# Patient Record
Sex: MALE | Race: OTHER | HISPANIC OR LATINO | URBAN - METROPOLITAN AREA
[De-identification: names, ages, dates, MRNs, and addresses within clinical notes are randomized per-mention and may not be internally consistent; named-entity substitution may affect disease eponyms.]

---

## 2024-02-20 ENCOUNTER — HOSPITAL ENCOUNTER (INPATIENT)
Facility: HOSPITAL | Age: 59
LOS: 1 days | Discharge: HOME | End: 2024-02-21
Attending: STUDENT IN AN ORGANIZED HEALTH CARE EDUCATION/TRAINING PROGRAM | Admitting: INTERNAL MEDICINE

## 2024-02-20 ENCOUNTER — APPOINTMENT (OUTPATIENT)
Dept: CARDIOLOGY | Facility: HOSPITAL | Age: 59
End: 2024-02-20

## 2024-02-20 DIAGNOSIS — E11.10 DIABETIC KETOACIDOSIS WITHOUT COMA ASSOCIATED WITH TYPE 2 DIABETES MELLITUS (MULTI): Primary | ICD-10-CM

## 2024-02-20 DIAGNOSIS — I15.2 HYPERTENSION ASSOCIATED WITH DIABETES (MULTI): ICD-10-CM

## 2024-02-20 DIAGNOSIS — E78.5 DYSLIPIDEMIA ASSOCIATED WITH TYPE 2 DIABETES MELLITUS (MULTI): ICD-10-CM

## 2024-02-20 DIAGNOSIS — I25.119 CORONARY ARTERY DISEASE INVOLVING NATIVE CORONARY ARTERY OF NATIVE HEART WITH ANGINA PECTORIS (CMS-HCC): Chronic | ICD-10-CM

## 2024-02-20 DIAGNOSIS — E11.59 HYPERTENSION ASSOCIATED WITH DIABETES (MULTI): ICD-10-CM

## 2024-02-20 DIAGNOSIS — E11.00 TYPE II DIABETES MELLITUS WITH HYPEROSMOLARITY, UNCONTROLLED (MULTI): ICD-10-CM

## 2024-02-20 DIAGNOSIS — E11.69 DYSLIPIDEMIA ASSOCIATED WITH TYPE 2 DIABETES MELLITUS (MULTI): ICD-10-CM

## 2024-02-20 LAB
ALBUMIN SERPL-MCNC: 4.3 G/DL (ref 3.5–5)
ALP BLD-CCNC: 154 U/L (ref 35–125)
ALT SERPL-CCNC: 95 U/L (ref 5–40)
ANION GAP BLDV CALCULATED.4IONS-SCNC: 24 MMOL/L (ref 10–25)
ANION GAP BLDV CALCULATED.4IONS-SCNC: ABNORMAL MMOL/L
ANION GAP SERPL CALC-SCNC: >19 MMOL/L
APPEARANCE UR: CLEAR
AST SERPL-CCNC: 57 U/L (ref 5–40)
B-OH-BUTYR+ACETOACET BLD-SCNC: >9 MMOL/L (ref 0.1–0.3)
BASE EXCESS BLDV CALC-SCNC: -6.3 MMOL/L (ref -2–3)
BASE EXCESS BLDV CALC-SCNC: -8.2 MMOL/L (ref -2–3)
BASOPHILS # BLD AUTO: 0.02 X10*3/UL (ref 0–0.1)
BASOPHILS NFR BLD AUTO: 0.4 %
BILIRUB SERPL-MCNC: 0.2 MG/DL (ref 0.1–1.2)
BILIRUB UR STRIP.AUTO-MCNC: NEGATIVE MG/DL
BODY TEMPERATURE: 37 DEGREES CELSIUS
BODY TEMPERATURE: 37 DEGREES CELSIUS
BUN SERPL-MCNC: 35 MG/DL (ref 8–25)
CA-I BLDV-SCNC: 1.14 MMOL/L (ref 1.1–1.33)
CA-I BLDV-SCNC: 1.27 MMOL/L (ref 1.1–1.33)
CALCIUM SERPL-MCNC: 10.2 MG/DL (ref 8.5–10.4)
CHLORIDE BLDV-SCNC: 102 MMOL/L (ref 98–107)
CHLORIDE BLDV-SCNC: 104 MMOL/L (ref 98–107)
CHLORIDE SERPL-SCNC: 97 MMOL/L (ref 97–107)
CO2 SERPL-SCNC: 17 MMOL/L (ref 24–31)
COLOR UR: ABNORMAL
CREAT SERPL-MCNC: 1 MG/DL (ref 0.4–1.6)
EGFRCR SERPLBLD CKD-EPI 2021: 88 ML/MIN/1.73M*2
EOSINOPHIL # BLD AUTO: 0.01 X10*3/UL (ref 0–0.7)
EOSINOPHIL NFR BLD AUTO: 0.2 %
ERYTHROCYTE [DISTWIDTH] IN BLOOD BY AUTOMATED COUNT: 13.2 % (ref 11.5–14.5)
EST. AVERAGE GLUCOSE BLD GHB EST-MCNC: 329 MG/DL
GLUCOSE BLD MANUAL STRIP-MCNC: 179 MG/DL (ref 74–99)
GLUCOSE BLD MANUAL STRIP-MCNC: 253 MG/DL (ref 74–99)
GLUCOSE BLD MANUAL STRIP-MCNC: 263 MG/DL (ref 74–99)
GLUCOSE BLD MANUAL STRIP-MCNC: 498 MG/DL (ref 74–99)
GLUCOSE BLDV-MCNC: 579 MG/DL (ref 74–99)
GLUCOSE BLDV-MCNC: 682 MG/DL (ref 74–99)
GLUCOSE SERPL-MCNC: 505 MG/DL (ref 65–99)
GLUCOSE UR STRIP.AUTO-MCNC: ABNORMAL MG/DL
HBA1C MFR BLD: 13.1 %
HCO3 BLDV-SCNC: 17.9 MMOL/L (ref 22–26)
HCO3 BLDV-SCNC: 19.1 MMOL/L (ref 22–26)
HCT VFR BLD AUTO: 53.7 % (ref 41–52)
HCT VFR BLD EST: 52 % (ref 41–52)
HCT VFR BLD EST: ABNORMAL %
HGB BLD-MCNC: 17.8 G/DL (ref 13.5–17.5)
HGB BLDV-MCNC: 17.4 G/DL (ref 13.5–17.5)
HGB BLDV-MCNC: ABNORMAL G/DL
IMM GRANULOCYTES # BLD AUTO: 0.01 X10*3/UL (ref 0–0.7)
IMM GRANULOCYTES NFR BLD AUTO: 0.2 % (ref 0–0.9)
INHALED O2 CONCENTRATION: 100 %
INHALED O2 CONCENTRATION: 100 %
KETONES UR STRIP.AUTO-MCNC: ABNORMAL MG/DL
LACTATE BLDV-SCNC: 1.5 MMOL/L (ref 0.4–2)
LACTATE BLDV-SCNC: 2 MMOL/L (ref 0.4–2)
LACTATE BLDV-SCNC: 3.2 MMOL/L (ref 0.4–2)
LEUKOCYTE ESTERASE UR QL STRIP.AUTO: NEGATIVE
LYMPHOCYTES # BLD AUTO: 1.05 X10*3/UL (ref 1.2–4.8)
LYMPHOCYTES NFR BLD AUTO: 23.2 %
MCH RBC QN AUTO: 29.5 PG (ref 26–34)
MCHC RBC AUTO-ENTMCNC: 33.1 G/DL (ref 32–36)
MCV RBC AUTO: 89 FL (ref 80–100)
MONOCYTES # BLD AUTO: 0.5 X10*3/UL (ref 0.1–1)
MONOCYTES NFR BLD AUTO: 11 %
NEUTROPHILS # BLD AUTO: 2.94 X10*3/UL (ref 1.2–7.7)
NEUTROPHILS NFR BLD AUTO: 65 %
NITRITE UR QL STRIP.AUTO: NEGATIVE
NRBC BLD-RTO: 0 /100 WBCS (ref 0–0)
OXYHGB MFR BLDV: 83.7 % (ref 45–75)
OXYHGB MFR BLDV: ABNORMAL %
PCO2 BLDV: 37 MM HG (ref 41–51)
PCO2 BLDV: 38 MM HG (ref 41–51)
PH BLDV: 7.28 PH (ref 7.33–7.43)
PH BLDV: 7.32 PH (ref 7.33–7.43)
PH UR STRIP.AUTO: 5 [PH]
PLATELET # BLD AUTO: 254 X10*3/UL (ref 150–450)
PO2 BLDV: 58 MM HG (ref 35–45)
PO2 BLDV: 58 MM HG (ref 35–45)
POTASSIUM BLDV-SCNC: ABNORMAL MMOL/L
POTASSIUM BLDV-SCNC: ABNORMAL MMOL/L
POTASSIUM SERPL-SCNC: 5.1 MMOL/L (ref 3.4–5.1)
PROT SERPL-MCNC: 8.2 G/DL (ref 5.9–7.9)
PROT UR STRIP.AUTO-MCNC: NEGATIVE MG/DL
RBC # BLD AUTO: 6.04 X10*6/UL (ref 4.5–5.9)
RBC # UR STRIP.AUTO: NEGATIVE /UL
SAO2 % BLDV: 86 % (ref 45–75)
SAO2 % BLDV: ABNORMAL %
SODIUM BLDV-SCNC: 134 MMOL/L (ref 136–145)
SODIUM BLDV-SCNC: 141 MMOL/L (ref 136–145)
SODIUM SERPL-SCNC: 142 MMOL/L (ref 133–145)
SP GR UR STRIP.AUTO: 1.04
UROBILINOGEN UR STRIP.AUTO-MCNC: NORMAL MG/DL
WBC # BLD AUTO: 4.5 X10*3/UL (ref 4.4–11.3)

## 2024-02-20 PROCEDURE — 2060000001 HC INTERMEDIATE ICU ROOM DAILY

## 2024-02-20 PROCEDURE — 82947 ASSAY GLUCOSE BLOOD QUANT: CPT

## 2024-02-20 PROCEDURE — 2500000002 HC RX 250 W HCPCS SELF ADMINISTERED DRUGS (ALT 637 FOR MEDICARE OP, ALT 636 FOR OP/ED): Performed by: INTERNAL MEDICINE

## 2024-02-20 PROCEDURE — 82010 KETONE BODYS QUAN: CPT

## 2024-02-20 PROCEDURE — 96375 TX/PRO/DX INJ NEW DRUG ADDON: CPT

## 2024-02-20 PROCEDURE — 96361 HYDRATE IV INFUSION ADD-ON: CPT

## 2024-02-20 PROCEDURE — 93005 ELECTROCARDIOGRAM TRACING: CPT

## 2024-02-20 PROCEDURE — 36415 COLL VENOUS BLD VENIPUNCTURE: CPT | Performed by: STUDENT IN AN ORGANIZED HEALTH CARE EDUCATION/TRAINING PROGRAM

## 2024-02-20 PROCEDURE — 2500000004 HC RX 250 GENERAL PHARMACY W/ HCPCS (ALT 636 FOR OP/ED): Performed by: INTERNAL MEDICINE

## 2024-02-20 PROCEDURE — 85025 COMPLETE CBC W/AUTO DIFF WBC: CPT | Performed by: STUDENT IN AN ORGANIZED HEALTH CARE EDUCATION/TRAINING PROGRAM

## 2024-02-20 PROCEDURE — 83605 ASSAY OF LACTIC ACID: CPT

## 2024-02-20 PROCEDURE — 83036 HEMOGLOBIN GLYCOSYLATED A1C: CPT | Performed by: INTERNAL MEDICINE

## 2024-02-20 PROCEDURE — 93010 ELECTROCARDIOGRAM REPORT: CPT | Performed by: INTERNAL MEDICINE

## 2024-02-20 PROCEDURE — 84132 ASSAY OF SERUM POTASSIUM: CPT

## 2024-02-20 PROCEDURE — 81003 URINALYSIS AUTO W/O SCOPE: CPT | Performed by: STUDENT IN AN ORGANIZED HEALTH CARE EDUCATION/TRAINING PROGRAM

## 2024-02-20 PROCEDURE — 84132 ASSAY OF SERUM POTASSIUM: CPT | Performed by: STUDENT IN AN ORGANIZED HEALTH CARE EDUCATION/TRAINING PROGRAM

## 2024-02-20 PROCEDURE — 96374 THER/PROPH/DIAG INJ IV PUSH: CPT

## 2024-02-20 PROCEDURE — 2500000004 HC RX 250 GENERAL PHARMACY W/ HCPCS (ALT 636 FOR OP/ED)

## 2024-02-20 PROCEDURE — 99285 EMERGENCY DEPT VISIT HI MDM: CPT | Mod: 25

## 2024-02-20 RX ORDER — ATORVASTATIN CALCIUM 80 MG/1
80 TABLET, FILM COATED ORAL DAILY
Status: ON HOLD | COMMUNITY
End: 2024-02-21 | Stop reason: SDUPTHER

## 2024-02-20 RX ORDER — INSULIN GLARGINE 100 [IU]/ML
30 INJECTION, SOLUTION SUBCUTANEOUS NIGHTLY
Status: DISCONTINUED | OUTPATIENT
Start: 2024-02-20 | End: 2024-02-21 | Stop reason: HOSPADM

## 2024-02-20 RX ORDER — METOPROLOL TARTRATE 25 MG/1
12.5 TABLET, FILM COATED ORAL 2 TIMES DAILY
COMMUNITY

## 2024-02-20 RX ORDER — DEXTROSE MONOHYDRATE 100 MG/ML
0.3 INJECTION, SOLUTION INTRAVENOUS ONCE AS NEEDED
Status: DISCONTINUED | OUTPATIENT
Start: 2024-02-20 | End: 2024-02-21 | Stop reason: HOSPADM

## 2024-02-20 RX ORDER — GLIMEPIRIDE 2 MG/1
2 TABLET ORAL
COMMUNITY
End: 2024-02-21 | Stop reason: HOSPADM

## 2024-02-20 RX ORDER — DEXTROSE 50 % IN WATER (D50W) INTRAVENOUS SYRINGE
25
Status: DISCONTINUED | OUTPATIENT
Start: 2024-02-20 | End: 2024-02-21 | Stop reason: HOSPADM

## 2024-02-20 RX ORDER — KETOROLAC TROMETHAMINE 30 MG/ML
30 INJECTION, SOLUTION INTRAMUSCULAR; INTRAVENOUS ONCE
Status: COMPLETED | OUTPATIENT
Start: 2024-02-20 | End: 2024-02-20

## 2024-02-20 RX ORDER — INSULIN LISPRO 100 [IU]/ML
0-10 INJECTION, SOLUTION INTRAVENOUS; SUBCUTANEOUS
Status: DISCONTINUED | OUTPATIENT
Start: 2024-02-20 | End: 2024-02-21 | Stop reason: HOSPADM

## 2024-02-20 RX ORDER — FENOFIBRATE 54 MG/1
54 TABLET ORAL DAILY
COMMUNITY

## 2024-02-20 RX ORDER — INSULIN LISPRO 100 [IU]/ML
5 INJECTION, SOLUTION INTRAVENOUS; SUBCUTANEOUS
Status: DISCONTINUED | OUTPATIENT
Start: 2024-02-20 | End: 2024-02-21 | Stop reason: HOSPADM

## 2024-02-20 RX ORDER — ACETAMINOPHEN 325 MG/1
650 TABLET ORAL EVERY 6 HOURS PRN
Status: DISCONTINUED | OUTPATIENT
Start: 2024-02-20 | End: 2024-02-21 | Stop reason: HOSPADM

## 2024-02-20 RX ORDER — LISINOPRIL 10 MG/1
10 TABLET ORAL DAILY
COMMUNITY

## 2024-02-20 RX ORDER — SODIUM CHLORIDE, SODIUM LACTATE, POTASSIUM CHLORIDE, CALCIUM CHLORIDE 600; 310; 30; 20 MG/100ML; MG/100ML; MG/100ML; MG/100ML
125 INJECTION, SOLUTION INTRAVENOUS CONTINUOUS
Status: DISCONTINUED | OUTPATIENT
Start: 2024-02-20 | End: 2024-02-21 | Stop reason: HOSPADM

## 2024-02-20 RX ORDER — ONDANSETRON HYDROCHLORIDE 2 MG/ML
4 INJECTION, SOLUTION INTRAVENOUS ONCE
Status: COMPLETED | OUTPATIENT
Start: 2024-02-20 | End: 2024-02-20

## 2024-02-20 RX ADMIN — INSULIN LISPRO 6 UNITS: 100 INJECTION, SOLUTION INTRAVENOUS; SUBCUTANEOUS at 17:14

## 2024-02-20 RX ADMIN — ACETAMINOPHEN 650 MG: 325 TABLET ORAL at 21:59

## 2024-02-20 RX ADMIN — INSULIN LISPRO 5 UNITS: 100 INJECTION, SOLUTION INTRAVENOUS; SUBCUTANEOUS at 17:15

## 2024-02-20 RX ADMIN — SODIUM CHLORIDE 1000 ML: 900 INJECTION, SOLUTION INTRAVENOUS at 11:07

## 2024-02-20 RX ADMIN — KETOROLAC TROMETHAMINE 30 MG: 30 INJECTION INTRAMUSCULAR; INTRAVENOUS at 11:07

## 2024-02-20 RX ADMIN — ONDANSETRON 4 MG: 2 INJECTION INTRAMUSCULAR; INTRAVENOUS at 11:07

## 2024-02-20 RX ADMIN — SODIUM CHLORIDE 1000 ML: 900 INJECTION, SOLUTION INTRAVENOUS at 11:08

## 2024-02-20 RX ADMIN — SODIUM CHLORIDE, SODIUM LACTATE, POTASSIUM CHLORIDE, AND CALCIUM CHLORIDE 125 ML/HR: 600; 310; 30; 20 INJECTION, SOLUTION INTRAVENOUS at 15:37

## 2024-02-20 SDOH — SOCIAL STABILITY: SOCIAL INSECURITY: HAS ANYONE EVER THREATENED TO HURT YOUR FAMILY OR YOUR PETS?: UNABLE TO ASSESS

## 2024-02-20 SDOH — SOCIAL STABILITY: SOCIAL INSECURITY: WERE YOU ABLE TO COMPLETE ALL THE BEHAVIORAL HEALTH SCREENINGS?: NO

## 2024-02-20 SDOH — SOCIAL STABILITY: SOCIAL INSECURITY: DO YOU FEEL ANYONE HAS EXPLOITED OR TAKEN ADVANTAGE OF YOU FINANCIALLY OR OF YOUR PERSONAL PROPERTY?: UNABLE TO ASSESS

## 2024-02-20 SDOH — SOCIAL STABILITY: SOCIAL INSECURITY: DOES ANYONE TRY TO KEEP YOU FROM HAVING/CONTACTING OTHER FRIENDS OR DOING THINGS OUTSIDE YOUR HOME?: UNABLE TO ASSESS

## 2024-02-20 SDOH — SOCIAL STABILITY: SOCIAL INSECURITY: ARE YOU OR HAVE YOU BEEN THREATENED OR ABUSED PHYSICALLY, EMOTIONALLY, OR SEXUALLY BY ANYONE?: UNABLE TO ASSESS

## 2024-02-20 SDOH — SOCIAL STABILITY: SOCIAL INSECURITY: HAVE YOU HAD THOUGHTS OF HARMING ANYONE ELSE?: NO

## 2024-02-20 SDOH — SOCIAL STABILITY: SOCIAL INSECURITY: DO YOU FEEL UNSAFE GOING BACK TO THE PLACE WHERE YOU ARE LIVING?: UNABLE TO ASSESS

## 2024-02-20 SDOH — SOCIAL STABILITY: SOCIAL INSECURITY: ARE THERE ANY APPARENT SIGNS OF INJURIES/BEHAVIORS THAT COULD BE RELATED TO ABUSE/NEGLECT?: UNABLE TO ASSESS

## 2024-02-20 ASSESSMENT — PAIN - FUNCTIONAL ASSESSMENT
PAIN_FUNCTIONAL_ASSESSMENT: 0-10

## 2024-02-20 ASSESSMENT — ENCOUNTER SYMPTOMS
DYSURIA: 0
WHEEZING: 0
SINUS PAIN: 0
DIAPHORESIS: 0
CONSTIPATION: 1
PALPITATIONS: 0
SHORTNESS OF BREATH: 0
NAUSEA: 1
UNEXPECTED WEIGHT CHANGE: 1
COUGH: 0
DIZZINESS: 1
LIGHT-HEADEDNESS: 0
FATIGUE: 1
VOMITING: 0
EYE PAIN: 0
ABDOMINAL PAIN: 1
FEVER: 0
HEMATURIA: 0
POLYDIPSIA: 1
NUMBNESS: 0
WEAKNESS: 1
CHILLS: 0
HEADACHES: 1

## 2024-02-20 ASSESSMENT — COGNITIVE AND FUNCTIONAL STATUS - GENERAL
MOBILITY SCORE: 24
DAILY ACTIVITIY SCORE: 24
PATIENT BASELINE BEDBOUND: NO
MOBILITY SCORE: 24
DAILY ACTIVITIY SCORE: 24

## 2024-02-20 ASSESSMENT — PAIN DESCRIPTION - ORIENTATION: ORIENTATION: RIGHT

## 2024-02-20 ASSESSMENT — PATIENT HEALTH QUESTIONNAIRE - PHQ9
SUM OF ALL RESPONSES TO PHQ9 QUESTIONS 1 & 2: 0
2. FEELING DOWN, DEPRESSED OR HOPELESS: NOT AT ALL
1. LITTLE INTEREST OR PLEASURE IN DOING THINGS: NOT AT ALL

## 2024-02-20 ASSESSMENT — PAIN SCALES - GENERAL
PAINLEVEL_OUTOF10: 0 - NO PAIN
PAINLEVEL_OUTOF10: 7
PAINLEVEL_OUTOF10: 0 - NO PAIN
PAINLEVEL_OUTOF10: 3
PAINLEVEL_OUTOF10: 0 - NO PAIN

## 2024-02-20 ASSESSMENT — ACTIVITIES OF DAILY LIVING (ADL)
LACK_OF_TRANSPORTATION: PATIENT DECLINED
FEEDING YOURSELF: INDEPENDENT
GROOMING: INDEPENDENT
HEARING - LEFT EAR: FUNCTIONAL
DRESSING YOURSELF: INDEPENDENT
WALKS IN HOME: INDEPENDENT
PATIENT'S MEMORY ADEQUATE TO SAFELY COMPLETE DAILY ACTIVITIES?: YES
BATHING: INDEPENDENT
ADEQUATE_TO_COMPLETE_ADL: YES
HEARING - RIGHT EAR: FUNCTIONAL
TOILETING: INDEPENDENT
JUDGMENT_ADEQUATE_SAFELY_COMPLETE_DAILY_ACTIVITIES: YES

## 2024-02-20 ASSESSMENT — COLUMBIA-SUICIDE SEVERITY RATING SCALE - C-SSRS
1. IN THE PAST MONTH, HAVE YOU WISHED YOU WERE DEAD OR WISHED YOU COULD GO TO SLEEP AND NOT WAKE UP?: NO
6. HAVE YOU EVER DONE ANYTHING, STARTED TO DO ANYTHING, OR PREPARED TO DO ANYTHING TO END YOUR LIFE?: NO
2. HAVE YOU ACTUALLY HAD ANY THOUGHTS OF KILLING YOURSELF?: NO

## 2024-02-20 ASSESSMENT — LIFESTYLE VARIABLES
AUDIT-C TOTAL SCORE: 0
HOW OFTEN DO YOU HAVE A DRINK CONTAINING ALCOHOL: NEVER
SKIP TO QUESTIONS 9-10: 1
HOW OFTEN DO YOU HAVE 6 OR MORE DRINKS ON ONE OCCASION: NEVER
HOW MANY STANDARD DRINKS CONTAINING ALCOHOL DO YOU HAVE ON A TYPICAL DAY: PATIENT DOES NOT DRINK
AUDIT-C TOTAL SCORE: 0

## 2024-02-20 ASSESSMENT — PAIN DESCRIPTION - DESCRIPTORS: DESCRIPTORS: ACHING

## 2024-02-20 ASSESSMENT — PAIN DESCRIPTION - ONSET: ONSET: GRADUAL

## 2024-02-20 ASSESSMENT — PAIN DESCRIPTION - LOCATION
LOCATION: HEAD
LOCATION: HEAD

## 2024-02-20 ASSESSMENT — PAIN DESCRIPTION - PROGRESSION: CLINICAL_PROGRESSION: GRADUALLY WORSENING

## 2024-02-20 ASSESSMENT — PAIN DESCRIPTION - PAIN TYPE: TYPE: ACUTE PAIN

## 2024-02-20 ASSESSMENT — PAIN DESCRIPTION - FREQUENCY: FREQUENCY: CONSTANT/CONTINUOUS

## 2024-02-20 NOTE — H&P
History Of Present Illness  Jacob Hewitt is a 58 y.o. male with past medical history of diabetes mellitus, myocardial infarction, open heart surgery presenting with ketoacidosis.    Patient reports that his symptoms started on Friday, 2/16/2024.  Symptoms started with polyuria, polydipsia, blurred vision, dry mouth, malaise and continued to progress until today when his wife urged him to go to the hospital.      He has had 2 similar episodes in the past 3 years with the most recent one being in early October 2023.  On October 30, 2023 patient had a MI and subsequent open heart surgery.    Patient is a poor historian and has difficulty remembering most of his past medical history.  He speaks very little English, however he is able to communicate with no issue with the , he just does not remember his own history very well.  He relies on his son away from the room with him to provide some of his history    Says that the only doctor he sees is his primary care in [place].  Only recalls taking one medication, however he does not remember name of it.  Recalls that he used to take metformin but was switched to a single diabetic medication. An office visit from [facility] records show that he took several medications, so we will need to clarify that with him and he is on the floor.    His current glucose as of 2:27 PM is 263, it was 498 at 9:32 AM soon after admission.     Past Medical History  History reviewed. No pertinent past medical history.  MI, October 30 2023  DKA  DM diagnosed about 6 years ago      Surgical History  History reviewed. No pertinent surgical history.  Open heart surgery on October 2023     Social History  He has no history on file for tobacco use, alcohol use, and drug use.      Family History  No family history on file.  Father diabetes mellitus, diagnosis at 40  Several siblings with diabetes mellitus, 40s or 50s      Allergies  Patient has no known allergies.  None    Review  of Systems   Constitutional:  Positive for fatigue and unexpected weight change (loss unspecified amount). Negative for chills, diaphoresis and fever.   HENT:  Negative for congestion, ear pain, hearing loss and sinus pain.    Eyes:  Positive for visual disturbance. Negative for pain.        Blurry vision bilaterally   Respiratory:  Negative for cough, shortness of breath and wheezing.    Cardiovascular:  Negative for chest pain, palpitations and leg swelling.   Gastrointestinal:  Positive for abdominal pain, constipation and nausea. Negative for vomiting.        Decreased appetite due to nausea   Endocrine: Positive for polydipsia and polyuria. Negative for cold intolerance and heat intolerance.   Genitourinary:  Negative for dysuria, hematuria and urgency.   Neurological:  Positive for dizziness, weakness and headaches. Negative for syncope, light-headedness and numbness.        Denies paresthesia        Physical Exam  Constitutional:       General: He is not in acute distress.     Appearance: Normal appearance. He is normal weight. He is not toxic-appearing or diaphoretic.   HENT:      Head: Normocephalic and atraumatic.   Eyes:      Pupils: Pupils are equal, round, and reactive to light.   Cardiovascular:      Rate and Rhythm: Normal rate and regular rhythm.      Heart sounds: No murmur heard.     No friction rub. No gallop.   Pulmonary:      Effort: Pulmonary effort is normal.      Breath sounds: No wheezing, rhonchi or rales.   Abdominal:      Palpations: Abdomen is soft. There is no mass.      Tenderness: There is no abdominal tenderness.      Hernia: No hernia is present.   Musculoskeletal:         General: No swelling or deformity.      Comments: Scar on anterior chest from recent open heart surgery, well-healed    Skin:     General: Skin is warm and dry.      Coloration: Skin is not jaundiced or pale.   Neurological:      General: No focal deficit present.      Mental Status: He is alert and oriented to  "person, place, and time.      Motor: No weakness.   Psychiatric:         Mood and Affect: Mood normal.         Behavior: Behavior normal.          Last Recorded Vitals  Blood pressure 142/78, pulse 81, temperature 36.2 °C (97.2 °F), resp. rate 13, height 1.702 m (5' 7\"), weight 66.3 kg (146 lb 2.6 oz), SpO2 99 %.    Relevant Results      Scheduled medications  insulin glargine, 30 Units, subcutaneous, Nightly  insulin lispro, 0-10 Units, subcutaneous, TID with meals  insulin lispro, 5 Units, subcutaneous, TID with meals      Continuous medications  lactated Ringer's, 125 mL/hr, Last Rate: 125 mL/hr (02/20/24 1537)      PRN medications  PRN medications: dextrose 10 % in water (D10W), dextrose, glucagon  Results for orders placed or performed during the hospital encounter of 02/20/24 (from the past 24 hour(s))   POCT GLUCOSE   Result Value Ref Range    POCT Glucose 498 (H) 74 - 99 mg/dL   CBC and Auto Differential   Result Value Ref Range    WBC 4.5 4.4 - 11.3 x10*3/uL    nRBC 0.0 0.0 - 0.0 /100 WBCs    RBC 6.04 (H) 4.50 - 5.90 x10*6/uL    Hemoglobin 17.8 (H) 13.5 - 17.5 g/dL    Hematocrit 53.7 (H) 41.0 - 52.0 %    MCV 89 80 - 100 fL    MCH 29.5 26.0 - 34.0 pg    MCHC 33.1 32.0 - 36.0 g/dL    RDW 13.2 11.5 - 14.5 %    Platelets 254 150 - 450 x10*3/uL    Neutrophils % 65.0 40.0 - 80.0 %    Immature Granulocytes %, Automated 0.2 0.0 - 0.9 %    Lymphocytes % 23.2 13.0 - 44.0 %    Monocytes % 11.0 2.0 - 10.0 %    Eosinophils % 0.2 0.0 - 6.0 %    Basophils % 0.4 0.0 - 2.0 %    Neutrophils Absolute 2.94 1.20 - 7.70 x10*3/uL    Immature Granulocytes Absolute, Automated 0.01 0.00 - 0.70 x10*3/uL    Lymphocytes Absolute 1.05 (L) 1.20 - 4.80 x10*3/uL    Monocytes Absolute 0.50 0.10 - 1.00 x10*3/uL    Eosinophils Absolute 0.01 0.00 - 0.70 x10*3/uL    Basophils Absolute 0.02 0.00 - 0.10 x10*3/uL   BLOOD GAS VENOUS FULL PANEL   Result Value Ref Range    POCT pH, Venous 7.28 (L) 7.33 - 7.43 pH    POCT pCO2, Venous 38 (L) 41 - 51 mm " Hg    POCT pO2, Venous 58 (H) 35 - 45 mm Hg    POCT SO2, Venous      POCT Oxy Hemoglobin, Venous      POCT Hematocrit Calculated, Venous      POCT Sodium, Venous 134 (L) 136 - 145 mmol/L    POCT Potassium, Venous      POCT Chloride, Venous 102 98 - 107 mmol/L    POCT Ionized Calicum, Venous 1.14 1.10 - 1.33 mmol/L    POCT Glucose, Venous 682 (HH) 74 - 99 mg/dL    POCT Lactate, Venous 3.2 (H) 0.4 - 2.0 mmol/L    POCT Base Excess, Venous -8.2 (L) -2.0 - 3.0 mmol/L    POCT HCO3 Calculated, Venous 17.9 (L) 22.0 - 26.0 mmol/L    POCT Hemoglobin, Venous      POCT Anion Gap, Venous      Patient Temperature 37.0 degrees Celsius    FiO2 100 %   Urinalysis with Reflex Culture and Microscopic   Result Value Ref Range    Color, Urine Light-Yellow Light-Yellow, Yellow, Dark-Yellow    Appearance, Urine Clear Clear    Specific Gravity, Urine 1.037 (N) 1.005 - 1.035    pH, Urine 5.0 5.0, 5.5, 6.0, 6.5, 7.0, 7.5, 8.0    Protein, Urine NEGATIVE NEGATIVE, 10 (TRACE), 20 (TRACE) mg/dL    Glucose, Urine OVER (4+) (A) Normal mg/dL    Blood, Urine NEGATIVE NEGATIVE    Ketones, Urine 80 (3+) (A) NEGATIVE mg/dL    Bilirubin, Urine NEGATIVE NEGATIVE    Urobilinogen, Urine Normal Normal mg/dL    Nitrite, Urine NEGATIVE NEGATIVE    Leukocyte Esterase, Urine NEGATIVE NEGATIVE   Blood Gas Venous Full Panel   Result Value Ref Range    POCT pH, Venous 7.32 (L) 7.33 - 7.43 pH    POCT pCO2, Venous 37 (L) 41 - 51 mm Hg    POCT pO2, Venous 58 (H) 35 - 45 mm Hg    POCT SO2, Venous 86 (H) 45 - 75 %    POCT Oxy Hemoglobin, Venous 83.7 (H) 45.0 - 75.0 %    POCT Hematocrit Calculated, Venous 52.0 41.0 - 52.0 %    POCT Sodium, Venous 141 136 - 145 mmol/L    POCT Potassium, Venous      POCT Chloride, Venous 104 98 - 107 mmol/L    POCT Ionized Calicum, Venous 1.27 1.10 - 1.33 mmol/L    POCT Glucose, Venous 579 (HH) 74 - 99 mg/dL    POCT Lactate, Venous 2.0 0.4 - 2.0 mmol/L    POCT Base Excess, Venous -6.3 (L) -2.0 - 3.0 mmol/L    POCT HCO3 Calculated, Venous  19.1 (L) 22.0 - 26.0 mmol/L    POCT Hemoglobin, Venous 17.4 13.5 - 17.5 g/dL    POCT Anion Gap, Venous 24.0 10.0 - 25.0 mmol/L    Patient Temperature 37.0 degrees Celsius    FiO2 100 %   Comprehensive Metabolic Panel   Result Value Ref Range    Glucose 505 (HH) 65 - 99 mg/dL    Sodium 142 133 - 145 mmol/L    Potassium 5.1 3.4 - 5.1 mmol/L    Chloride 97 97 - 107 mmol/L    Bicarbonate 17 (L) 24 - 31 mmol/L    Urea Nitrogen 35 (H) 8 - 25 mg/dL    Creatinine 1.00 0.40 - 1.60 mg/dL    eGFR 88 >60 mL/min/1.73m*2    Calcium 10.2 8.5 - 10.4 mg/dL    Albumin 4.3 3.5 - 5.0 g/dL    Alkaline Phosphatase 154 (H) 35 - 125 U/L    Total Protein 8.2 (H) 5.9 - 7.9 g/dL    AST 57 (H) 5 - 40 U/L    Bilirubin, Total 0.2 0.1 - 1.2 mg/dL    ALT 95 (H) 5 - 40 U/L    Anion Gap >19 (H) <=19 mmol/L   POCT GLUCOSE   Result Value Ref Range    POCT Glucose 263 (H) 74 - 99 mg/dL          Assessment/Plan   Principal Problem:    Diabetic ketoacidosis without coma associated with type 2 diabetes mellitus (CMS/[facility])         Diabetic ketoacidosis   -insulin glargine, 30 Units, subcutaneous, Nightly  -insulin lispro, 5 Units, subcutaneous, TID with meals  -Monitor glucose and CBC hourly  -Lactated Ringer's continuous infusion 125 mg/h     Diabetes melitis type 2   -Same plan as above   -Continue at home medication after resolution of DKA    Coronary artery disease   -Continue medication regimen after verifying med list with patient upon admittance to unit   -Order basic lipid panel    Zee Swenson OMS III  4:06 PM 2/20/2024

## 2024-02-20 NOTE — CONSULTS
"Inpatient Diabetes Education Consult    Reason for Visit:  Jacob Hewitt is a 58 y.o. male who presents to ED on 2/20/24 at 09:40 with c/o \"increased thirst, blurred vision, dry mouth, fatigue\"  Found to have a BS of 505 mg/dl at 11:00.  Found to be in DKA    Consulting Service/Provider: GABE Dunn    BS today 2/20/24 at 09;32 was 498 mg/dl and at 14:27 was 263 mg/d.  Getting Lantus 30 units at bedtime and Lispro 0-10 units tid plus lispro 5 units tid.  H/o T2DM (dx 6 yrs ago), MI & CABG 10/2023.  Per medical record patient speaks very little English.  HbA1c in progress.  Messaged WINTER Oh to let me know if family shows up to see what time I can meet with family to educate them & patient on T2DM.  Also, left a phone message for Michelle Pascual [phone] (contact in Epic)  to see if she/family will be at hospital to educate patient.     PTA Medications:  No current outpatient medications    Glucose   Date/Time Value Ref Range Status   02/20/2024 11:00  (HH) 65 - 99 mg/dL Final     Comment:     Result rechecked   05/29/2023 10:53  (H) 65 - 99 MG/DL Final   01/08/2023 08:28  (H) 65 - 99 MG/DL Final   07/17/2019 08:34  (H) 65 - 99 MG/DL Final   02/18/2019 09:02 PM 1,138 (H) 65 - 99 MG/DL Final     Comment:     RECHECKED DILUTED   VERIPHY       No results found for: \"CPEPTIDE\"  Hemoglobin A1C   Date Value Ref Range Status   10/31/2022 6.3 (H) 4.3 - 5.6 % Final     Comment:     American Diabetes Association guidelines indicate that patients with HgbA1c in the range 5.7-6.4% are at increased risk for development of diabetes, and intervention by lifestyle modification may be beneficial. HgbA1c greater or equal to 6.5% is considered diagnostic of diabetes.   07/17/2019 6.3 (H) 4.0 - 6.0 % Final     Comment:     Hemoglobin A1C levels are related to mean blood glucose during the   preceding 2-3 months. The relationship table below may be used as a   general guide. Each 1% increase in HGB A1C is a " reflection of an   increase in mean glucose of approximately 30 mg/dl.   Reference: Diabetes Care, volume 29, supplement 1 Jan. 2006                        HGB A1C ................. Approx. Mean Glucose   _______________________________________________   6%   ...............................  120 mg/dl   7%   ...............................  150 mg/dl   8%   ...............................  180 mg/dl   9%   ...............................  210 mg/dl   10%  ...............................  240 mg/dl  Performed at [address] [address]     03/20/2019 10.0 (H) 4.0 - 6.0 % Final     Comment:     Hemoglobin A1C levels are related to mean blood glucose during the   preceding 2-3 months. The relationship table below may be used as a   general guide. Each 1% increase in HGB A1C is a reflection of an   increase in mean glucose of approximately 30 mg/dl.   Reference: Diabetes Care, volume 29, supplement 1 Jan. 2006                        HGB A1C ................. Approx. Mean Glucose   _______________________________________________   6%   ...............................  120 mg/dl   7%   ...............................  150 mg/dl   8%   ...............................  180 mg/dl   9%   ...............................  210 mg/dl   10%  ...............................  240 mg/dl  Performed at [address] [facility] [address]

## 2024-02-20 NOTE — ED PROVIDER NOTES
HPI   Chief Complaint   Patient presents with    Hyperglycemia     498 BG, headache dizziness x 1 week, urine frequency, known diabetic taken meds as prescribed, no vomit or diarrhea, recently flew to the [place]       HPI  Patient is a 58-year-old male with history of coronary artery disease and DM2 who presents to ED for hyperglycemia.  Patient reports dizziness, headache, blurry vision, increased thirst x 1 week.  Denies fever or chills, chest pain or shortness of breath, nausea vomiting or diarrhea, urinary symptoms.  Patient denies any recent illness.  Denies prior hospitalization for DKA.  Patient smokes cigarettes for 40 years, quit 2 weeks ago.  Denies alcohol or drug use.  Patient is Sammarinese-speaking only, lives in [place].  Reports PCP is Dr. Cheney.  History was taken using  services                  No data recorded                   Patient History   No past medical history on file.  No past surgical history on file.  No family history on file.  Social History     Tobacco Use    Smoking status: Not on file    Smokeless tobacco: Not on file   Substance Use Topics    Alcohol use: Not on file    Drug use: Not on file       Physical Exam   ED Triage Vitals [02/20/24 0934]   Temperature Heart Rate Respirations BP   36.4 °C (97.5 °F) (!) 101 20 (!) 137/102      Pulse Ox Temp Source Heart Rate Source Patient Position   98 % Oral Radial Sitting      BP Location FiO2 (%)     Right arm --       Physical Exam  Constitutional:       Appearance: He is ill-appearing.   HENT:      Head: Normocephalic and atraumatic.      Mouth/Throat:      Mouth: Mucous membranes are dry.   Eyes:      Extraocular Movements: Extraocular movements intact.   Cardiovascular:      Rate and Rhythm: Tachycardia present.   Pulmonary:      Effort: Pulmonary effort is normal.   Abdominal:      General: Abdomen is flat.   Musculoskeletal:         General: Normal range of motion.      Cervical back: Neck supple.   Skin:     General:  Skin is dry.   Neurological:      Mental Status: He is alert and oriented to person, place, and time.   Psychiatric:         Mood and Affect: Mood normal.         Behavior: Behavior normal.         ED Course & MDM   Diagnoses as of 02/20/24 1513   Diabetic ketoacidosis without coma associated with type 2 diabetes mellitus (CMS/HCC)       Medical Decision Making  Parts of this chart have been completed using voice recognition software. Please excuse any errors of transcription.  My thought process and reason for plan has been formulated from the time that I saw the patient until the time of disposition and is not specific to one specific moment during their visit and furthermore my MDM encompasses this entire chart and not only this text box.      HPI: Detailed above.    Exam: A medically appropriate exam performed, outlined above, given the known history and presentation.    History obtained from: Patient    EKG: Interpreted by Dr. Garcia    Social Determinants of Health considered during this visit: Patient is Moroccan-speaking only    Medications given during visit:  Medications   insulin lispro (HumaLOG) injection 5 Units (has no administration in time range)   insulin glargine (Lantus) injection 30 Units (has no administration in time range)   dextrose 50 % injection 25 g (has no administration in time range)   glucagon (Glucagen) injection 1 mg (has no administration in time range)   dextrose 10 % in water (D10W) infusion (has no administration in time range)   insulin lispro (HumaLOG) injection 0-10 Units (has no administration in time range)   lactated Ringer's infusion (has no administration in time range)   sodium chloride 0.9 % bolus 1,000 mL (0 mL intravenous Stopped 2/20/24 1243)   ondansetron (Zofran) injection 4 mg (4 mg intravenous Given 2/20/24 1107)   sodium chloride 0.9 % bolus 1,000 mL (0 mL intravenous Stopped 2/20/24 1243)   ketorolac (Toradol) injection 30 mg (30 mg intravenous Given 2/20/24  7326)        Diagnostic/tests  Labs Reviewed   CBC WITH AUTO DIFFERENTIAL - Abnormal       Result Value    WBC 4.5      nRBC 0.0      RBC 6.04 (*)     Hemoglobin 17.8 (*)     Hematocrit 53.7 (*)     MCV 89      MCH 29.5      MCHC 33.1      RDW 13.2      Platelets 254      Neutrophils % 65.0      Immature Granulocytes %, Automated 0.2      Lymphocytes % 23.2      Monocytes % 11.0      Eosinophils % 0.2      Basophils % 0.4      Neutrophils Absolute 2.94      Immature Granulocytes Absolute, Automated 0.01      Lymphocytes Absolute 1.05 (*)     Monocytes Absolute 0.50      Eosinophils Absolute 0.01      Basophils Absolute 0.02     COMPREHENSIVE METABOLIC PANEL - Abnormal    Glucose 505 (*)     Sodium 142      Potassium 5.1      Chloride 97      Bicarbonate 17 (*)     Urea Nitrogen 35 (*)     Creatinine 1.00      eGFR 88      Calcium 10.2      Albumin 4.3      Alkaline Phosphatase 154 (*)     Total Protein 8.2 (*)     AST 57 (*)     Bilirubin, Total 0.2      ALT 95 (*)     Anion Gap >19 (*)    BLOOD GAS VENOUS FULL PANEL - Abnormal    POCT pH, Venous 7.28 (*)     POCT pCO2, Venous 38 (*)     POCT pO2, Venous 58 (*)     POCT SO2, Venous        POCT Oxy Hemoglobin, Venous        POCT Hematocrit Calculated, Venous        POCT Sodium, Venous 134 (*)     POCT Potassium, Venous        POCT Chloride, Venous 102      POCT Ionized Calicum, Venous 1.14      POCT Glucose, Venous 682 (*)     POCT Lactate, Venous 3.2 (*)     POCT Base Excess, Venous -8.2 (*)     POCT HCO3 Calculated, Venous 17.9 (*)     POCT Hemoglobin, Venous        POCT Anion Gap, Venous        Patient Temperature 37.0      FiO2 100     URINALYSIS WITH REFLEX CULTURE AND MICROSCOPIC - Abnormal    Color, Urine Light-Yellow      Appearance, Urine Clear      Specific Gravity, Urine 1.037 (*)     pH, Urine 5.0      Protein, Urine NEGATIVE      Glucose, Urine OVER (4+) (*)     Blood, Urine NEGATIVE      Ketones, Urine 80 (3+) (*)     Bilirubin, Urine NEGATIVE       Urobilinogen, Urine Normal      Nitrite, Urine NEGATIVE      Leukocyte Esterase, Urine NEGATIVE     BLOOD GAS VENOUS FULL PANEL - Abnormal    POCT pH, Venous 7.32 (*)     POCT pCO2, Venous 37 (*)     POCT pO2, Venous 58 (*)     POCT SO2, Venous 86 (*)     POCT Oxy Hemoglobin, Venous 83.7 (*)     POCT Hematocrit Calculated, Venous 52.0      POCT Sodium, Venous 141      POCT Potassium, Venous        POCT Chloride, Venous 104      POCT Ionized Calicum, Venous 1.27      POCT Glucose, Venous 579 (*)     POCT Lactate, Venous 2.0      POCT Base Excess, Venous -6.3 (*)     POCT HCO3 Calculated, Venous 19.1 (*)     POCT Hemoglobin, Venous 17.4      POCT Anion Gap, Venous 24.0      Patient Temperature 37.0      FiO2 100     POCT GLUCOSE - Abnormal    POCT Glucose 498 (*)    URINALYSIS WITH REFLEX CULTURE AND MICROSCOPIC    Narrative:     The following orders were created for panel order Urinalysis with Reflex Culture and Microscopic.  Procedure                               Abnormality         Status                     ---------                               -----------         ------                     Urinalysis with Reflex C...[[id number]]  Abnormal            Final result               Extra Urine Gray Tube[[id number]]                                                         Please view results for these tests on the individual orders.   EXTRA URINE GRAY TUBE   BETA HYDROXYBUTYRATE   BLOOD GAS LACTIC ACID, VENOUS   HEMOGLOBIN A1C      No orders to display        Considerations/further MDM:  Initial point-of-care glucose was 500.  Concern for DKA.  Patient is hemodynamically stable.  Patient appears dehydrated on physical exam, exam otherwise unremarkable.  Patient given 2 L normal saline, Toradol for headache, Zofran for nausea.  Venous blood gas significant for pH of 7.32, glucose of 579.  Anion gap greater than 19, elevated liver enzymes.  No significant electrolyte abnormality.  Urinalysis significant for glucose and  ketones.  CBC is without abnormality.  Given concern for DKA, patient was admitted to inpatient medical service.  Blood sugar at time of admission was 263.  I spoke with Dr. Rojas. We thoroughly discussed the history, physical exam, laboratory and imaging studies, as well as, emergency department course. Based upon that discussion, we've decided to admit for further observation and evaluation of their DKA.      Procedure  Procedures     Gerald Guzman PA-C  02/20/24 1536

## 2024-02-20 NOTE — ED PROVIDER NOTES
Supervisory note:  Patient seen in conjunction with DARRYL Calle.    Patient presents with not feeling well for the last approximately 1 week.  He reports that he has been having headache, lightheadedness, increased thirst, increased urination for the last week.  He reports that he has been taking his diabetes medications as prescribed and has not missed any doses.  He denies any recent fever, cough, or vomiting.  On examination, heart and lung sounds are unremarkable.  Abdomen is soft without tenderness to palpation.  Patient is awake, alert, answers questions appropriately.    EKG interpreted by me: Normal sinus rhythm, rate 88.  Rightward axis.  Possible LPFB.  No significant ST or T wave abnormalities.  Borderline Q waves in inferior leads.    Laboratory studies significant for mild acidosis on venous blood gas.  Bicarbonate level noted to be substantially decreased and anion gap is positive.  Patient will be suffering from DKA in the setting of very elevated blood sugar levels.  Patient given 2 L of IV fluids and accepted to hospitalist service for further management.  Insulin drip initiated.    I personally saw the patient and made/approved the management plan and take responsiblity for the patient management.  Parts of this chart were completed with dictation software, please excuse any errors in transcription.     Rob Garcia MD  02/20/24 1081

## 2024-02-21 VITALS
RESPIRATION RATE: 17 BRPM | HEART RATE: 72 BPM | WEIGHT: 155.65 LBS | DIASTOLIC BLOOD PRESSURE: 76 MMHG | SYSTOLIC BLOOD PRESSURE: 124 MMHG | BODY MASS INDEX: 24.43 KG/M2 | OXYGEN SATURATION: 100 % | TEMPERATURE: 97.3 F | HEIGHT: 67 IN

## 2024-02-21 LAB
ALBUMIN SERPL-MCNC: 3.4 G/DL (ref 3.5–5)
ALP BLD-CCNC: 98 U/L (ref 35–125)
ALT SERPL-CCNC: 63 U/L (ref 5–40)
ANION GAP SERPL CALC-SCNC: >19 MMOL/L
AST SERPL-CCNC: 30 U/L (ref 5–40)
BILIRUB SERPL-MCNC: 0.3 MG/DL (ref 0.1–1.2)
BUN SERPL-MCNC: 33 MG/DL (ref 8–25)
CALCIUM SERPL-MCNC: 8.9 MG/DL (ref 8.5–10.4)
CHLORIDE SERPL-SCNC: 105 MMOL/L (ref 97–107)
CO2 SERPL-SCNC: 17 MMOL/L (ref 24–31)
CREAT SERPL-MCNC: 1 MG/DL (ref 0.4–1.6)
EGFRCR SERPLBLD CKD-EPI 2021: 88 ML/MIN/1.73M*2
GLUCOSE BLD MANUAL STRIP-MCNC: 180 MG/DL (ref 74–99)
GLUCOSE BLD MANUAL STRIP-MCNC: 221 MG/DL (ref 74–99)
GLUCOSE SERPL-MCNC: 196 MG/DL (ref 65–99)
MAGNESIUM SERPL-MCNC: 2.1 MG/DL (ref 1.6–3.1)
POTASSIUM SERPL-SCNC: 4.3 MMOL/L (ref 3.4–5.1)
PROT SERPL-MCNC: 6.5 G/DL (ref 5.9–7.9)
SODIUM SERPL-SCNC: 144 MMOL/L (ref 133–145)

## 2024-02-21 PROCEDURE — 82947 ASSAY GLUCOSE BLOOD QUANT: CPT

## 2024-02-21 PROCEDURE — 83735 ASSAY OF MAGNESIUM: CPT | Performed by: INTERNAL MEDICINE

## 2024-02-21 PROCEDURE — RXMED WILLOW AMBULATORY MEDICATION CHARGE

## 2024-02-21 PROCEDURE — 36415 COLL VENOUS BLD VENIPUNCTURE: CPT | Performed by: INTERNAL MEDICINE

## 2024-02-21 PROCEDURE — 2500000004 HC RX 250 GENERAL PHARMACY W/ HCPCS (ALT 636 FOR OP/ED): Performed by: INTERNAL MEDICINE

## 2024-02-21 PROCEDURE — 80053 COMPREHEN METABOLIC PANEL: CPT | Performed by: INTERNAL MEDICINE

## 2024-02-21 RX ORDER — INSULIN GLARGINE 100 [IU]/ML
INJECTION, SOLUTION SUBCUTANEOUS
Qty: 30 ML | Refills: 11 | Status: SHIPPED | OUTPATIENT
Start: 2024-02-21

## 2024-02-21 RX ORDER — ACETAMINOPHEN 325 MG/1
650 TABLET ORAL EVERY 6 HOURS PRN
Qty: 30 TABLET | Refills: 0 | Status: SHIPPED | OUTPATIENT
Start: 2024-02-21

## 2024-02-21 RX ORDER — INSULIN LISPRO 100 [IU]/ML
10 INJECTION, SOLUTION INTRAVENOUS; SUBCUTANEOUS
Qty: 15 ML | Refills: 11 | Status: SHIPPED | OUTPATIENT
Start: 2024-02-21

## 2024-02-21 RX ORDER — ATORVASTATIN CALCIUM 80 MG/1
40 TABLET, FILM COATED ORAL DAILY
Qty: 30 TABLET | Refills: 3 | Status: SHIPPED | OUTPATIENT
Start: 2024-02-21

## 2024-02-21 RX ADMIN — INSULIN LISPRO 4 UNITS: 100 INJECTION, SOLUTION INTRAVENOUS; SUBCUTANEOUS at 12:45

## 2024-02-21 RX ADMIN — SODIUM CHLORIDE, SODIUM LACTATE, POTASSIUM CHLORIDE, AND CALCIUM CHLORIDE 125 ML/HR: 600; 310; 30; 20 INJECTION, SOLUTION INTRAVENOUS at 03:54

## 2024-02-21 RX ADMIN — INSULIN LISPRO 5 UNITS: 100 INJECTION, SOLUTION INTRAVENOUS; SUBCUTANEOUS at 12:48

## 2024-02-21 RX ADMIN — INSULIN LISPRO 5 UNITS: 100 INJECTION, SOLUTION INTRAVENOUS; SUBCUTANEOUS at 08:43

## 2024-02-21 RX ADMIN — INSULIN LISPRO 2 UNITS: 100 INJECTION, SOLUTION INTRAVENOUS; SUBCUTANEOUS at 08:44

## 2024-02-21 ASSESSMENT — PAIN SCALES - GENERAL
PAINLEVEL_OUTOF10: 0 - NO PAIN

## 2024-02-21 ASSESSMENT — COGNITIVE AND FUNCTIONAL STATUS - GENERAL
DAILY ACTIVITIY SCORE: 24
MOBILITY SCORE: 24

## 2024-02-21 ASSESSMENT — PAIN - FUNCTIONAL ASSESSMENT
PAIN_FUNCTIONAL_ASSESSMENT: 0-10
PAIN_FUNCTIONAL_ASSESSMENT: 0-10

## 2024-02-21 NOTE — CONSULTS
"Inpatient Diabetes Education Consult follow up:    PCP is Dr. Ethel Alanis from [facility].     Current Outpatient Medications   Medication Instructions    atorvastatin (LIPITOR) 80 mg, oral, Daily    empagliflozin (JARDIANCE) 25 mg, oral, Daily    fenofibrate (TRICOR) 54 mg, oral, Daily    glimepiride (AMARYL) 2 mg, oral, Daily before breakfast    lisinopril 10 mg, oral, Daily    metoprolol tartrate (LOPRESSOR) 12.5 mg, oral, 2 times daily       Glucose   Date/Time Value Ref Range Status   02/21/2024 05:22  (H) 65 - 99 mg/dL Final   02/20/2024 11:00  (HH) 65 - 99 mg/dL Final     Comment:     Result rechecked   05/29/2023 10:53  (H) 65 - 99 MG/DL Final   01/08/2023 08:28  (H) 65 - 99 MG/DL Final   07/17/2019 08:34  (H) 65 - 99 MG/DL Final   02/18/2019 09:02 PM 1,138 (H) 65 - 99 MG/DL Final     Comment:     RECHECKED DILUTED   VERIPHY       No results found for: \"CPEPTIDE\"  Hemoglobin A1C   Date Value Ref Range Status   02/20/2024 13.1 (H) See below % Final   10/31/2022 6.3 (H) 4.3 - 5.6 % Final     Comment:     American Diabetes Association guidelines indicate that patients with HgbA1c in the range 5.7-6.4% are at increased risk for development of diabetes, and intervention by lifestyle modification may be beneficial. HgbA1c greater or equal to 6.5% is considered diagnostic of diabetes.   07/17/2019 6.3 (H) 4.0 - 6.0 % Final     Comment:     Hemoglobin A1C levels are related to mean blood glucose during the   preceding 2-3 months. The relationship table below may be used as a   general guide. Each 1% increase in HGB A1C is a reflection of an   increase in mean glucose of approximately 30 mg/dl.   Reference: Diabetes Care, volume 29, supplement 1 Jan. 2006                        HGB A1C ................. Approx. Mean Glucose   _______________________________________________   6%   ...............................  120 mg/dl   7%   ...............................  150 " mg/dl   8%   ...............................  180 mg/dl   9%   ...............................  210 mg/dl   10%  ...............................  240 mg/dl  Performed at [address] [address]     03/20/2019 10.0 (H) 4.0 - 6.0 % Final     Comment:     Hemoglobin A1C levels are related to mean blood glucose during the   preceding 2-3 months. The relationship table below may be used as a   general guide. Each 1% increase in HGB A1C is a reflection of an   increase in mean glucose of approximately 30 mg/dl.   Reference: Diabetes Care, volume 29, supplement 1 Jan. 2006                        HGB A1C ................. Approx. Mean Glucose   _______________________________________________   6%   ...............................  120 mg/dl   7%   ...............................  150 mg/dl   8%   ...............................  180 mg/dl   9%   ...............................  210 mg/dl   10%  ...............................  240 mg/dl  Performed at [address] [address]

## 2024-02-21 NOTE — CARE PLAN
The patient's goals for the shift include lower blood sugar    The clinical goals for the shift include BG will be normalized by the end of shift      Problem: Discharge Planning  Goal: Discharge to home or other facility with appropriate resources  Outcome: Progressing     Problem: Diabetes  Goal: Achieve decreasing blood glucose levels by end of shift  Outcome: Progressing  Goal: Increase stability of blood glucose readings by end of shift  Outcome: Progressing  Goal: Decrease in ketones present in urine by end of shift  Outcome: Progressing  Goal: Maintain electrolyte levels within acceptable range throughout shift  Outcome: Progressing  Goal: Maintain glucose levels >70mg/dl to <250mg/dl throughout shift  Outcome: Progressing  Goal: No changes in neurological exam by end of shift  Outcome: Progressing  Goal: Learn about and adhere to nutrition recommendations by end of shift  Outcome: Progressing  Goal: Vital signs within normal range for age by end of shift  Outcome: Progressing  Goal: Increase self care and/or family involovement by end of shift  Outcome: Progressing  Goal: Receive DSME education by end of shift  Outcome: Progressing

## 2024-02-21 NOTE — CONSULTS
"Inpatient Diabetes Education Consult follow up:     Met with patient & family for 120 minutes to discuss self care management of T2DM.     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/2023  PTA Medications:    Current Outpatient Medications   Medication Instructions    atorvastatin (LIPITOR) 80 mg, oral, Daily    empagliflozin (JARDIANCE) 25 mg, oral, Daily    fenofibrate (TRICOR) 54 mg, oral, Daily    glimepiride (AMARYL) 2 mg, oral, Daily before breakfast    lisinopril 10 mg, oral, Daily    metoprolol tartrate (LOPRESSOR) 12.5 mg, oral, 2 times daily       Glucose   Date/Time Value Ref Range Status   02/21/2024 05:22  (H) 65 - 99 mg/dL Final   02/20/2024 11:00  (HH) 65 - 99 mg/dL Final     Comment:     Result rechecked   05/29/2023 10:53  (H) 65 - 99 MG/DL Final   01/08/2023 08:28  (H) 65 - 99 MG/DL Final   07/17/2019 08:34  (H) 65 - 99 MG/DL Final   02/18/2019 09:02 PM 1,138 (H) 65 - 99 MG/DL Final     Comment:     RECHECKED DILUTED   VERIPHY       No results found for: \"CPEPTIDE\"  Hemoglobin A1C   Date Value Ref Range Status   02/20/2024 13.1 (H) See below % Final   10/31/2022 6.3 (H) 4.3 - 5.6 % Final     Comment:     American Diabetes Association guidelines indicate that patients with HgbA1c in the range 5.7-6.4% are at increased risk for development of diabetes, and intervention by lifestyle modification may be beneficial. HgbA1c greater or equal to 6.5% is " considered diagnostic of diabetes.   07/17/2019 6.3 (H) 4.0 - 6.0 % Final     Comment:     Hemoglobin A1C levels are related to mean blood glucose during the   preceding 2-3 months. The relationship table below may be used as a   general guide. Each 1% increase in HGB A1C is a reflection of an   increase in mean glucose of approximately 30 mg/dl.   Reference: Diabetes Care, volume 29, supplement 1 Jan. 2006                        HGB A1C ................. Approx. Mean Glucose   _______________________________________________   6%   ...............................  120 mg/dl   7%   ...............................  150 mg/dl   8%   ...............................  180 mg/dl   9%   ...............................  210 mg/dl   10%  ...............................  240 mg/dl  Performed at [address] [address]     03/20/2019 10.0 (H) 4.0 - 6.0 % Final     Comment:     Hemoglobin A1C levels are related to mean blood glucose during the   preceding 2-3 months. The relationship table below may be used as a   general guide. Each 1% increase in HGB A1C is a reflection of an   increase in mean glucose of approximately 30 mg/dl.   Reference: Diabetes Care, volume 29, supplement 1 Jan. 2006                        HGB A1C ................. Approx. Mean Glucose   _______________________________________________   6%   ...............................  120 mg/dl   7%   ...............................  150 mg/dl   8%   ...............................  180 mg/dl   9%   ...............................  210 mg/dl   10%  ...............................  240 mg/dl  Performed at [facility] [address] [facility] [address]         Patient Learning/Readiness Assessment:  Instructed on target BS ranges, what is HbA1c & goal less than 7%, His results were 13.1% dated 2/2023,  Patient speaks very little english so family member, Michelle Pascual was at the bedside interpreting.  Instructed on target BS ranges,  instructed  "on s/s of hypo-hyperglycemia & actions to take, eye/foot care, sickday guidelines, and the importance of exercise.    Patient admits understanding.    Called [facility] in [place] to find out his PCP.  His PCP is   Dr. Ethel Gallo   Instructed on how to use/storage/disposal of insulin pens.  Patient was able to do return demo without difficulty.  Discussed the importance of Lantus insulin to take at the same time everyday.  Discussed the importance of rotating injection sites with each injection.   Discussed the importance of Lispro insulin - taking it with food it front of him.  Tried to explain sliding scale had difficulty understanding.  Suggest using constant dose for each meal.    Patient states, His PCP  is from [facility] in [place]\"  Denies having insurance.  Discussed getting insulin from  retail pharmacy for 35.00 each.  Pt is agreeable.   Diabetes [facility] book & handouts given & reviewed     Patient agreeable to outpatient diabetes education.  Referral placed     Recommendations for bedside nursing: Allow patient to self-inject insulin (supervised)    Recommendations for Providers:  agreeable to meds to meds & can get insulin discounted through [facility] retail pharmacy       BS today 2/21 at 08:41 was 180 mg/dl.   Will continue to monitor POCT       "

## 2024-02-21 NOTE — CONSULTS
"Nutrition Assessement Note    Visited patient at 1309-- unable to enter due to discharge time.     Nutrition Assessment    Reason for Assessment: Dietitian discretion (DKA)    Reason for Hospital Admission:  Jacob Hewitt is a 58 y.o. male who is admitted for hyperglycemia, dizziness, headache, blurry vision, increased thirst x 1 week. history of coronary artery disease and DM2. Pt is Bruneian speaking and asleep at visit, spoke with family.     Nutrition History:  Food and Nutrient History: Family stated that pt's appetite/ intake has been low for 2 weeks, but has improved  Energy Intake: Good > 75 %      Anthropometrics:  Ht: 170.2 cm (5' 7\"), Wt: 70.6 kg (155 lb 10.3 oz), BMI: 24.37  IBW/kg (Dietitian Calculated): 67.27 kg          Weight Change:  Daily Weight  02/21/24 : 70.6 kg (155 lb 10.3 oz)  07/30/19 : 70.8 kg (156 lb)  03/27/19 : 73 kg (161 lb)  02/26/19 : 73.9 kg (163 lb)     Weight History / % Weight Change: Family denied weight changes             Nutrition Focused Physical Exam Findings:   Subcutaneous Fat Loss  Orbital Fat Pads: Defer  Buccal Fat Pads: Defer  Triceps: Defer  Ribs: Defer    Muscle Wasting  Temporalis: Defer  Pectoralis (Clavicular Region): Defer  Deltoid/Trapezius: Defer  Interosseous: Defer  Trapezius/Infraspinatus/Supraspinatus (Scapular Region): Defer  Quadriceps: Defer  Gastrocnemius: Defer              Nutrition Significant Labs:  Lab Results   Component Value Date    WBC 4.5 02/20/2024    HGB 17.8 (H) 02/20/2024    HCT 53.7 (H) 02/20/2024     02/20/2024    CHOL 179 03/20/2019    TRIG 154 (H) 03/20/2019    HDL 37 (L) 03/20/2019    ALT 63 (H) 02/21/2024    AST 30 02/21/2024     02/21/2024    K 4.3 02/21/2024     02/21/2024    CREATININE 1.00 02/21/2024    BUN 33 (H) 02/21/2024    CO2 17 (L) 02/21/2024    HGBA1C 13.1 (H) 02/20/2024    ALBUR 12 07/30/2019       Current Facility-Administered Medications:     acetaminophen (Tylenol) tablet 650 mg, 650 mg, oral, q6h " PRN, Margo Peterson MD, 650 mg at 02/20/24 2159    dextrose 10 % in water (D10W) infusion, 0.3 g/kg/hr, intravenous, Once PRN, Nicolas Sierra DO    dextrose 50 % injection 25 g, 25 g, intravenous, q15 min PRN, Nicolas Sierra DO    glucagon (Glucagen) injection 1 mg, 1 mg, intramuscular, q15 min PRN, Nicolas Sierra DO    insulin glargine (Lantus) injection 30 Units, 30 Units, subcutaneous, Nightly, Nicolas Sierra DO    insulin lispro (HumaLOG) injection 0-10 Units, 0-10 Units, subcutaneous, TID with meals, Nicolas Sierra DO, 4 Units at 02/21/24 1245    insulin lispro (HumaLOG) injection 5 Units, 5 Units, subcutaneous, TID with meals, Nicolas Sierra DO, 5 Units at 02/21/24 1248    lactated Ringer's infusion, 125 mL/hr, intravenous, Continuous, Nicolas Sierra DO, Stopped at 02/21/24 0958    Current Outpatient Medications:     acetaminophen (Tylenol) 325 mg tablet, Take 2 tablets (650 mg) by mouth every 6 hours if needed for mild pain (1 - 3)., Disp: 30 tablet, Rfl: 0    atorvastatin (Lipitor) 80 mg tablet, Take 0.5 tablets (40 mg) by mouth once daily., Disp: 30 tablet, Rfl: 3    fenofibrate (Tricor) 54 mg tablet, Take 1 tablet (54 mg) by mouth once daily., Disp: , Rfl:     insulin lispro (HumaLOG) 100 unit/mL injection, Inject 10 Units under the skin 3 times a day before meals., Disp: 15 mL, Rfl: 11    insulin glargine (Lantus) 100 unit/mL (3 mL) pen, Inject 30 units subcutaneously at bedtime daily as directed., Disp: 30 mL, Rfl: 11    lisinopril 10 mg tablet, Take 1 tablet (10 mg) by mouth once daily., Disp: , Rfl:     metoprolol tartrate (Lopressor) 25 mg tablet, Take 0.5 tablets (12.5 mg) by mouth 2 times a day., Disp: , Rfl:     Dietary Orders (From admission, onward)       Start     Ordered    02/20/24 1503  Adult diet Regular, Carb Controlled; 60 gram carb/meal, 30 gram Carb evening snack  Diet effective now        Question Answer Comment   Diet type  Regular    Diet type Carb Controlled    Carb diet selection: 60 gram carb/meal, 30 gram Carb evening snack        02/20/24 1502                  Estimated Needs:   Estimated Energy Needs  Total Energy Estimated Needs (kCal):  (0859-1348)  Total Estimated Energy Need per Day (kCal/kg):  (25-30)  Method for Estimating Needs: actual wt    Estimated Protein Needs  Total Protein Estimated Needs (g):  (57-71)  Total Protein Estimated Needs (g/kg):  (0.8-1)  Method for Estimating Needs: actual wt    Estimated Fluid Needs  Total Fluid Estimated Needs (mL):  (1304-1105)  Method for Estimating Needs: 1 mL/kcal        Nutrition Diagnosis   Nutrition Diagnosis:       Nutrition Diagnosis  Patient has Nutrition Diagnosis: Yes  Diagnosis Status (1): New  Nutrition Diagnosis 1: Food and nutrition related knowledge deficit  Related to (1): Needs review of diet education  As Evidenced by (1): conditions associated with diagnosis       Nutrition Interventions/Recommendations   Nutrition Interventions and Recommendations:    Nutrition Prescription:  Individualized Nutrition Prescription Provided for : 2047-4206 kcals, 57-71 gm protein via CCD    Nutrition Interventions:   Food and/or Nutrient Delivery Interventions  Interventions: Meals and snacks  Meals and Snacks: Carbohydrate-modified diet  Goal: Provide diet as ordered    Education Documentation  Nutrition Care Manual, taught by Nelda Clemons RDN, RICK at 2/21/2024  1:09 PM.  Learner: Family  Readiness: Acceptance  Method: Explanation, Handout  Response: Verbalizes Understanding  Comment: Carb-counting and meal planning booklet             Nutrition Monitoring and Evaluation   Monitoring/Evaluation:   Food/Nutrient Related History Monitoring  Monitoring and Evaluation Plan: Energy intake  Energy Intake: Estimated energy intake  Criteria: pt to consume >/= 75% estimated needs         Biochemical Data, Medical Tests and Procedures  Monitoring and Evaluation Plan: Glucose/endocrine  profile  Glucose/Endocrine Profile: Glucose, casual, Glucose, fasting, Hemoglobin A1c (HgbA1c)  Criteria: labs will trend towards desirable range         Time Spent/Follow-up:   Follow Up  Time Spent (min): 20 minutes  Last Date of Nutrition Visit: 02/21/24  Nutrition Follow-Up Needed?: 5-7 days  Follow up Comment: 2/26/24

## 2024-02-21 NOTE — SIGNIFICANT EVENT
Nurse called me to evaluate patient for reported bradycardia.  She did an EKG which showed normal rate in the 70s.  I reviewed all the recorded bradycardia alarms and in my opinion they are all artifactual because his actual rate was in the 70s.   Tylenol for casual headache

## 2024-02-21 NOTE — PROGRESS NOTES
02/21/24 1200   Discharge Planning   Home or Post Acute Services None   Patient expects to be discharged to: Home with no needs.  DM Ed saw patient.   Does the patient need discharge transport arranged? No   RoundTrip coordination needed? No     Patient is from home.  DM Ed saw patient.  Patient anticipated to discharge home with no needs.  RN TCC following.    1220  Per [facility], patient over income for Medicaid.  Patient can utilize [facility], [facility] or discount prescription cards for medications.  Bedside nurse advised of same.

## 2024-02-21 NOTE — DISCHARGE SUMMARY
Discharge Diagnosis  Diabetic ketoacidosis without coma associated with type 2 diabetes mellitus (CMS/[facility])    Issues Requiring Follow-Up  Outpatient endocrinology and primary care follow-up    Discharge Meds     Your medication list        START taking these medications        Instructions Last Dose Given Next Dose Due   acetaminophen 325 mg tablet  Commonly known as: Tylenol      Take 2 tablets (650 mg) by mouth every 6 hours if needed for mild pain (1 - 3).       insulin aspart 100 unit/mL (3 mL) pen  Commonly known as: NovoLOG Flexpen U-100 Insulin      Inject 10 Units under the skin 3 times a day before meals. Take as directed per insulin instructions.       insulin glargine 100 unit/mL injection  Commonly known as: Lantus      Inject 30 units subcutaneously at bedtime daily as directed.              CHANGE how you take these medications        Instructions Last Dose Given Next Dose Due   atorvastatin 80 mg tablet  Commonly known as: Lipitor  What changed: how much to take      Take 0.5 tablets (40 mg) by mouth once daily.              CONTINUE taking these medications        Instructions Last Dose Given Next Dose Due   fenofibrate 54 mg tablet  Commonly known as: Tricor           lisinopril 10 mg tablet           metoprolol tartrate 25 mg tablet  Commonly known as: Lopressor                  STOP taking these medications      empagliflozin 25 mg  Commonly known as: Jardiance        glimepiride 2 mg tablet  Commonly known as: Amaryl                  Where to Get Your Medications        These medications were sent to [facility]  [address], [place] [facility] [address]      Hours: 9 AM to 6 PM Mon-Fri, 9 AM to 1 PM Sat Phone: [phone]   acetaminophen 325 mg tablet  atorvastatin 80 mg tablet  insulin aspart 100 unit/mL (3 mL) pen  insulin glargine 100 unit/mL injection         Test Results Pending At Discharge  Pending Labs       Order Current Status    Extra Urine Gray Tube Collected  (02/20/24 1011)    Urinalysis with Reflex Culture and Microscopic In process            Hospital Course   58-year-old  Sudanese-speaking male presents with uncontrolled type 2 diabetes mellitus has been treated with oral agents since approximately May 2023 he had initial improvement in his hemoglobin A1c from 13-6.2 however he presents with evidence of diabetic ketoacidosis in the setting of type 2 diabetes with beta cell failure required initiation of IV insulin therapy and aggressive volume resuscitation he was able to transition from IV insulin to subcu Lantus and Humalog.  He had difficulties understanding basal bolus corrective doses but he was able to be maintained on 10 units of Humalog Premeal 3 times daily and 30 units of Lantus subcu daily.  He was continued on his lisinopril metoprolol and atorvastatin from previous coronary artery disease.  Outpatient follow-up with primary care physician and referral to endocrinology is requested he is instructed not to take any additional oral diabetic medications as his pancreas seems time to recover from beta cell failure and that specialty input from endocrinology relative to the timing and types of oral agents would be required to ensure successful reduction in his insulin regime possible    Pertinent Physical Exam At Time of Discharge  Physical Exam  Vitals and nursing note reviewed.   Constitutional:       Appearance: Normal appearance.   HENT:      Head: Normocephalic and atraumatic.      Mouth/Throat:      Mouth: Mucous membranes are moist.   Eyes:      Extraocular Movements: Extraocular movements intact.      Pupils: Pupils are equal, round, and reactive to light.   Cardiovascular:      Rate and Rhythm: Normal rate and regular rhythm.      Pulses: Normal pulses.      Heart sounds: Normal heart sounds.   Pulmonary:      Effort: Pulmonary effort is normal.      Breath sounds: Normal breath sounds.   Abdominal:      General: Abdomen is flat.       Palpations: Abdomen is soft.   Musculoskeletal:         General: Normal range of motion.      Cervical back: Normal range of motion and neck supple.   Skin:     General: Skin is warm and dry.      Capillary Refill: Capillary refill takes less than 2 seconds.   Neurological:      General: No focal deficit present.      Mental Status: He is alert and oriented to person, place, and time. Mental status is at baseline.   Psychiatric:         Mood and Affect: Mood normal.         Outpatient Follow-Up  No future appointments.      Nicolas Sierra DO

## 2024-02-22 PROCEDURE — RXMED WILLOW AMBULATORY MEDICATION CHARGE

## 2024-02-23 ENCOUNTER — PHARMACY VISIT (OUTPATIENT)
Dept: PHARMACY | Facility: CLINIC | Age: 59
End: 2024-02-23
Payer: COMMERCIAL

## 2024-02-23 PROCEDURE — RXMED WILLOW AMBULATORY MEDICATION CHARGE

## 2024-02-25 LAB
ATRIAL RATE: 88 BPM
P AXIS: 77 DEGREES
P OFFSET: 206 MS
P ONSET: 145 MS
PR INTERVAL: 136 MS
Q ONSET: 213 MS
QRS COUNT: 15 BEATS
QRS DURATION: 96 MS
QT INTERVAL: 394 MS
QTC CALCULATION(BAZETT): 476 MS
QTC FREDERICIA: 447 MS
R AXIS: 127 DEGREES
T AXIS: 20 DEGREES
T OFFSET: 410 MS
VENTRICULAR RATE: 88 BPM